# Patient Record
Sex: MALE | Race: WHITE
[De-identification: names, ages, dates, MRNs, and addresses within clinical notes are randomized per-mention and may not be internally consistent; named-entity substitution may affect disease eponyms.]

---

## 2017-02-15 ENCOUNTER — HOSPITAL ENCOUNTER (EMERGENCY)
Dept: HOSPITAL 25 - UCCORT | Age: 77
Discharge: LEFT BEFORE DISCHARGE | End: 2017-02-15
Payer: SELF-PAY

## 2017-02-15 DIAGNOSIS — L98.8: Primary | ICD-10-CM

## 2017-02-15 DIAGNOSIS — Z53.20: ICD-10-CM

## 2017-02-16 ENCOUNTER — HOSPITAL ENCOUNTER (EMERGENCY)
Dept: HOSPITAL 25 - UCCORT | Age: 77
Discharge: HOME | End: 2017-02-16
Payer: SELF-PAY

## 2017-02-16 VITALS — DIASTOLIC BLOOD PRESSURE: 67 MMHG | SYSTOLIC BLOOD PRESSURE: 153 MMHG

## 2017-02-16 DIAGNOSIS — L72.3: Primary | ICD-10-CM

## 2017-02-16 PROCEDURE — 99211 OFF/OP EST MAY X REQ PHY/QHP: CPT

## 2017-02-16 PROCEDURE — G0463 HOSPITAL OUTPT CLINIC VISIT: HCPCS

## 2017-02-16 NOTE — UC
Skin Complaint HPI





- HPI Summary


HPI Summary: 





pt presents with c/o large nontender "bumps"on his back that have been there 

for ~1 month.  Pt denies any injury or trauma.  Pt has history of sebaceous 

cyst.





- History of Current Complaint


Chief Complaint: UCSkin


Time Seen by Provider: 02/16/17 18:12


Stated Complaint: BACK SKIN COMPLAINT


Hx Obtained From: Patient


Onset/Duration: Gradual Onset, Lasting Weeks, Still Present


Skin Exposure Onset/Duration: Weeks Ago


Timing: Constant


Onset Severity: Mild


Current Severity: Mild


Location: Discrete


Character: Swelling, Raised


Aggravating: Touch


Associated Signs & Symptoms: Positive: Drainage





- Allergy/Home Medications


Allergies/Adverse Reactions: 


 Allergies











Allergy/AdvReac Type Severity Reaction Status Date / Time


 


No Known Allergies Allergy   Verified 02/16/17 18:06














Review of Systems


Constitutional: Negative


Skin: Other - two 3 cm X 2 cm flucuant non tender, non erythematous  areas at 

left lateral flank and right mid upper back.   history sebaceous cyst


Eyes: Negative


ENT: Negative


Respiratory: Negative


Cardiovascular: Negative


Gastrointestinal: Negative


Genitourinary: Negative


Motor: Negative


Neurovascular: Negative


Musculoskeletal: Negative


Neurological: Negative


Psychological: Negative


All Other Systems Reviewed And Are Negative: Yes





PMH/Surg Hx/FS Hx/Imm Hx


Previously Healthy: No - see pmh 


Cardiovascular History Of: Reports: Hypertension


   Denies: Cardiac Disorders


Respiratory History Of: Reports: COPD - start of;  CT done `11/2013 at [facility].





- Surgical History


Surgical History: Yes


Surgery Procedure, Year, and Place: Left inguinal hernia 10/2013; right 

inguinal hernia 1999.  appendectomy 5/2015





- Family History


Known Family History: Positive: Cardiac Disease





- Social History


Lives: With Family


Alcohol Use: Rare


Substance Use Type: None


Smoking Status (MU): Heavy Every Day Tobacco Smoker


Amount Used/How Often: 1/2- 3/4 ppd


Length of Time of Smoking/Using Tobacco: since age 11 yo





- Immunization History


Most Recent Tetanus Shot: UTD





Physical Exam


Triage Information Reviewed: Yes


Appearance: Well-Appearing


Vital Signs: 


 Initial Vital Signs











Temp  97.8 F   02/16/17 18:02


 


Pulse  70   02/16/17 18:02


 


Resp  18   02/16/17 18:02


 


BP  153/67   02/16/17 18:02


 


Pulse Ox  100   02/16/17 18:02











Neck exam: Normal


Respiratory Exam: Normal


Cardiovascular Exam: Normal


Musculoskeletal Exam: Normal


Neurological Exam: Normal


Psychological Exam: Normal


Skin Exam: Other - 3cm X 2 cm flucuant mass left flank without drainage 

slightly tender to palpation, 3cm X 2 cm flucuant mass with large amount of 

white discharge with palpation.





Course/Dx





- Course


Course Of Treatment: I discused with the pt and pt's with the need to warm pack 

the sebaceous cycsts and manuyal drain them.  Pt declined incision and drainage





- Differential Diagnoses - Skin Complaint


Differential Diagnoses: Abscess, Cellulitis, MRSA





- Diagnoses


Provider Diagnoses: two sebaceous cysts non infected





Discharge





- Discharge Plan


Condition: Stable


Disposition: HOME


Patient Education Materials:  Cyst (ED)


Referrals: 


GIANNI Wallace [Primary Care Provider] -

## 2018-04-23 ENCOUNTER — HOSPITAL ENCOUNTER (EMERGENCY)
Dept: HOSPITAL 25 - UCCORT | Age: 78
Discharge: HOME | End: 2018-04-23
Payer: SELF-PAY

## 2018-04-23 VITALS — DIASTOLIC BLOOD PRESSURE: 82 MMHG | SYSTOLIC BLOOD PRESSURE: 172 MMHG

## 2018-04-23 DIAGNOSIS — I10: ICD-10-CM

## 2018-04-23 DIAGNOSIS — F17.200: ICD-10-CM

## 2018-04-23 DIAGNOSIS — S40.212A: ICD-10-CM
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
## 2018-04-23 DIAGNOSIS — S40.012A: Primary | ICD-10-CM

## 2018-04-23 DIAGNOSIS — Z23: ICD-10-CM
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
## 2018-04-23 DIAGNOSIS — M19.012: ICD-10-CM

## 2018-04-23 PROCEDURE — G0463 HOSPITAL OUTPT CLINIC VISIT: HCPCS

## 2018-04-23 PROCEDURE — 90715 TDAP VACCINE 7 YRS/> IM: CPT

## 2018-04-23 PROCEDURE — 96372 THER/PROPH/DIAG INJ SC/IM: CPT

## 2018-04-23 PROCEDURE — 99212 OFFICE O/P EST SF 10 MIN: CPT

## 2018-04-23 NOTE — ED
Upper Extremity Pain





- HPI Summary


HPI Summary: 





77 yr old male with left shoulder pain.  He tripped on a stone in his driveway.

  Denies head trauma; denies LOC.    Complains of pain and limited ROM to the 

left shoulder.  Denies other injuries.  





- History of Current Complaint


Chief Complaint: UCUpperExtremity


Stated Complaint: L SHOULDER INJURY


Time Seen by Provider: 04/23/18 21:05





- Allergies/Home Medications


Allergies/Adverse Reactions: 


 Allergies











Allergy/AdvReac Type Severity Reaction Status Date / Time


 


No Known Allergies Allergy   Verified 04/23/18 20:59














PMH/Surg Hx/FS Hx/Imm Hx


Cardiovascular History: Reports: Hx Hypertension - taken off meds by PCP


Respiratory History: Reports: Hx Chronic Obstructive Pulmonary Disease (COPD) - 

start of;  CT done `11/2013 at [facility].





- Surgical History


Surgery Procedure, Year, and Place: Left inguinal hernia 10/2013; right 

inguinal hernia 1999.  appendectomy 5/2015


Infectious Disease History: No


Infectious Disease History: 


   Denies: Traveled Outside the  in Last 30 Days





- Family History


Known Family History: Positive: Cardiac Disease





- Social History


Occupation: Retired


Lives: With Family


Alcohol Use: Rare


Substance Use Type: Reports: None


Smoking Status (MU): Heavy Every Day Tobacco Smoker


Amount Used/How Often: 1/2- 3/4 ppd


Length of Time of Smoking/Using Tobacco: since age 11 yo





Review of Systems


Constitutional: Negative


Eyes: Negative


Positive: Other - left shoulder pain after fall and landing on it


Positive: Other - abrasion left posterior shoulder


All Other Systems Reviewed And Are Negative: Yes





Physical Exam


Triage Information Reviewed: Yes


Vital Signs On Initial Exam: 


 Initial Vitals











Temp Pulse Resp BP Pulse Ox


 


 98.1 F   84   19   172/82   98 


 


 04/23/18 20:59  04/23/18 20:59  04/23/18 20:59  04/23/18 20:59  04/23/18 20:59











Vital Signs Reviewed: Yes


Appearance: Positive: Well-Appearing, No Pain Distress


Skin: Positive: Warm, Other - abrasion left lateral shoulder


Head/Face: Positive: Normal Head/Face Inspection


Eyes: Positive: EOMI


ENT: Positive: Normal ENT inspection


Neck: Positive: Nontender


Respiratory/Lung Sounds: Positive: Clear to Auscultation, Breath Sounds Present


Cardiovascular: Positive: RRR, Pulses are Symmetrical in both Upper and Lower 

Extremities


Abdomen Description: Positive: Nontender


Male Genital Exam: Negative: Erythema


Musculoskeletal: Positive: Other - tender over the proximal humerus, Limited 

ROM due to pain.   Neuro vasc intact left hand.


Neurological: Positive: Sensory/Motor Intact, Alert, Oriented to Person Place, 

Time, CN Intact II-III, Normal Gait, Speech Normal


Psychiatric: Positive: Normal





- Ramos Coma Scale


Best Eye Response: 4 - Spontaneous


Best Motor Response: 6 - Obeys Commands


Best Verbal Response: 5 - Oriented


Coma Scale Total: 15





Diagnostics





- Vital Signs


 Vital Signs











  Temp Pulse Resp BP Pulse Ox


 


 04/23/18 20:59  98.1 F  84  19  172/82  98














- Laboratory


Lab Statement: Any lab studies that have been ordered have been reviewed, and 

results considered in the medical decision making process.





Course/Dx





- Course


Course Of Treatment: 77 yr old male with left shoulder contusion, AC joint 

arthritis.  Plan DC home.  FU with Ortho.





- Diagnoses


Provider Diagnoses: 


 Contusion of shoulder, left, Abrasion, Hypertension








Discharge





- Sign-Out/Discharge


Documenting (check all that apply): Discharge/Admit/Transfer





- Discharge Plan


Condition: Good


Disposition: HOME


Patient Education Materials:  Hypertension (ED), Abrasion (ED), Rotator Cuff 

Injury (ED)


Referrals: 


No Primary Care Phys,NOPCP [Primary Care Provider] - 


Devin Davis MD [Medical Doctor] - 2 Days


[facility] PHYSICIAN REFERRAL [Outside] - 2 Days





- Billing Disposition and Condition


Condition: GOOD


Disposition: HOME

## 2018-04-23 NOTE — RAD
Indication: Left shoulder pain.



3 views of left shoulder demonstrates AC joint arthritis. No fracture is identified.



IMPRESSION: Mild AC joint arthritis without fracture.

## 2019-09-25 ENCOUNTER — HOSPITAL ENCOUNTER (EMERGENCY)
Dept: HOSPITAL 25 - UCCORT | Age: 79
Discharge: HOME | End: 2019-09-25
Payer: MEDICARE

## 2019-09-25 VITALS — DIASTOLIC BLOOD PRESSURE: 60 MMHG | SYSTOLIC BLOOD PRESSURE: 156 MMHG

## 2019-09-25 DIAGNOSIS — B02.9: Primary | ICD-10-CM

## 2019-09-25 DIAGNOSIS — I10: ICD-10-CM

## 2019-09-25 DIAGNOSIS — F17.210: ICD-10-CM

## 2019-09-25 PROCEDURE — G0463 HOSPITAL OUTPT CLINIC VISIT: HCPCS

## 2019-09-25 PROCEDURE — 99212 OFFICE O/P EST SF 10 MIN: CPT

## 2019-09-25 NOTE — ED
Skin Complaint





- HPI Summary


HPI Summary: 





79 yr old with rash right side of body on buttocks, part way down back of upper 

right thigh, and right scrotum and on medial distal right thigh. Onset of rash 

first 2 days ago and now it is spread.  Painful, pin and needle and burning at 

times.   No fever or chills. He does not feel ill otherwise. No other 

complaints. 





- History of Current Complaint


Chief Complaint: UCSkin


Time Seen by Provider: 09/25/19 14:30


Stated Complaint: SKIN COMP


Pain Intensity: 5





- Allergy/Home Medications


Allergies/Adverse Reactions: 


 Allergies











Allergy/AdvReac Type Severity Reaction Status Date / Time


 


No Known Allergies Allergy   Verified 09/25/19 13:23











Home Medications: 


 Home Medications





Acetaminophen [Tylenol Extra Strength] 1,000 mg PO ONCE 09/25/19 [History 

Confirmed 09/25/19]











PMH/Surg Hx/FS Hx/Imm Hx


Cardiovascular History: Reports: Hx Hypertension - taken off meds by PCP


Respiratory History: Reports: Hx Chronic Obstructive Pulmonary Disease (COPD) - 

start of;  CT done `11/2013 at [facility].





- Surgical History


Surgery Procedure, Year, and Place: Left inguinal hernia 10/2013; right 

inguinal hernia 1999.  appendectomy 5/2015


Infectious Disease History: Yes


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


   Denies: Traveled Outside the [place] in Last 30 Days





- Family History


Known Family History: Positive: Cardiac Disease





- Social History


Occupation: Retired


Lives: With Family


Alcohol Use: Rare


Substance Use Type: Reports: None


Smoking Status (MU): Heavy Every Day Tobacco Smoker


Amount Used/How Often: 1/2- 3/4 ppd


Length of Time of Smoking/Using Tobacco: since age 13 yo





Review of Systems


Constitutional: Negative


Positive: Rash


All Other Systems Reviewed And Are Negative: Yes





Physical Exam


Triage Information Reviewed: Yes


Vital Signs On Initial Exam: 


 Initial Vitals











Temp Pulse Resp BP Pulse Ox


 


 98.1 F   71   16   156/60   96 


 


 09/25/19 13:24  09/25/19 13:24  09/25/19 13:24  09/25/19 13:24  09/25/19 13:24











Vital Signs Reviewed: Yes


Appearance: Positive: Well-Appearing, No Pain Distress


Skin: Positive: Other - vesicular rash on red base in s1,s2 and L3 dermatomes 

right side consistent with shingles.


Head/Face: Positive: Normal Head/Face Inspection


Eyes: Positive: EOMI


ENT: Positive: Normal ENT inspection


Neck: Positive: Nontender


Respiratory/Lung Sounds: Positive: Clear to Auscultation, Breath Sounds Present


Cardiovascular: Positive: RRR.  Negative: Murmur


Abdomen Description: Negative: Distended


Male Genital Exam: Positive: Normal Genitalia, No Hernia.  Negative: Scrotum 

Tenderness (R), Scrotum Tenderness (L), Testicular Tenderness (R), Testicular 

Tenderness (L)


Musculoskeletal: Positive: Strength/ROM Intact


Neurological: Positive: Sensory/Motor Intact, Alert, Oriented to Person Place, 

Time, CN Intact II-III, Normal Gait, Speech Normal





Diagnostics





- Vital Signs


 Vital Signs











  Temp Pulse Resp BP Pulse Ox


 


 09/25/19 13:24  98.1 F  71  16  156/60  96














- Laboratory


Lab Statement: Any lab studies that have been ordered have been reviewed, and 

results considered in the medical decision making process.





Course/Dx





- Course


Course Of Treatment: s1,s2,L3 rx with valtrex





- Diagnoses


Provider Diagnoses: 


 Shingles, Hypertension








Discharge ED





- Sign-Out/Discharge


Documenting (check all that apply): Patient Departure


All imaging exams completed and their final reports reviewed: No Studies





- Discharge Plan


Condition: Good


Disposition: HOME


Prescriptions: 


ValACYclovir (*) [Valtrex 1 GM(*)] 1 gm PO TID #21 tab


Patient Education Materials:  Shingles (ED), Hypertension (ED)


Referrals: 


Ho Ackerman, NP [Primary Care Provider] - 2 Days





- Billing Disposition and Condition


Condition: GOOD


Disposition: Home